# Patient Record
Sex: MALE | URBAN - METROPOLITAN AREA
[De-identification: names, ages, dates, MRNs, and addresses within clinical notes are randomized per-mention and may not be internally consistent; named-entity substitution may affect disease eponyms.]

---

## 2020-06-30 ENCOUNTER — TELEPHONE (OUTPATIENT)
Dept: TRANSPLANT | Facility: CLINIC | Age: 75
End: 2020-06-30

## 2020-06-30 NOTE — TELEPHONE ENCOUNTER
----- Message from Prosper Reese sent at 6/30/2020  9:06 AM CDT -----  Returned call to pt wife and she told me that the pt was de-listed at [facility] for cardiac issues; they could not determine if he need a stent or open heart sx. Last MELD was 13 per wife.     Sent email with demo packet to fill out and send back.   ----- Message -----  From: Prosper Reese  Sent: 6/30/2020   9:06 AM CDT  To: Prosper Lorenzana sent to [facility] Pre-Liver Transplant Non-Clinical  Caller: Flor (Today,  8:32 AM)         Calling to receive some info about liver transplant.     Flor Lucas # [phone]

## 2020-07-06 ENCOUNTER — TELEPHONE (OUTPATIENT)
Dept: TRANSPLANT | Facility: CLINIC | Age: 75
End: 2020-07-06

## 2020-07-06 NOTE — TELEPHONE ENCOUNTER
----- Message from Prosper Reese sent at 7/6/2020  7:56 AM CDT -----  Have the patient's demographic sheet which includes: patient's name, mailing address, phone number and insurance information. We will check to see if patient medical records are in Care Everywhere:Abraham Jerez

## 2020-07-14 ENCOUNTER — TELEPHONE (OUTPATIENT)
Dept: TRANSPLANT | Facility: CLINIC | Age: 75
End: 2020-07-14

## 2020-07-14 NOTE — TELEPHONE ENCOUNTER
Returned call, lvm. Records from [place] and other facilties reviewed.  Pt case is complicated. Pt 75 debilitated by records, question need for cardiac intervention due to CAD. Pt will be discuss at our pt discussion committee on Tuesday next week.

## 2020-07-14 NOTE — TELEPHONE ENCOUNTER
----- Message from Minerva Melendez sent at 7/14/2020 11:25 AM CDT -----  Contact: Mrs Lucas wife    ----- Message -----  From: Micaela Hill  Sent: 7/14/2020  10:10 AM CDT  To: [facility] Liver Referral Pool    Teion,     Pt wife wants to verify if you received information from [facility] in [place] [place]  So the pt can schedule his appt      Pt contact [phone]

## 2020-07-21 ENCOUNTER — TELEPHONE (OUTPATIENT)
Dept: TRANSPLANT | Facility: CLINIC | Age: 75
End: 2020-07-21

## 2020-07-21 NOTE — TELEPHONE ENCOUNTER
Wife called and informed pt discussed at committee today and the team feels due to his Age, CAD, and other co morbidities he is too high risk for transplant.